# Patient Record
(demographics unavailable — no encounter records)

---

## 2024-10-08 NOTE — HISTORY OF PRESENT ILLNESS
[FreeTextEntry1] : Mr. 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, CAD, type 2 DM    who presented for follow up evaluation of type 2 Diabetes/ incidental thyroid nodules    Diagnosis: over 10 years now Current Regimen: Trulicity 4.5 mg Q week , metformin 1000 mg BID  , glyburide 5 mg OD in am , Toujeo 10 units daily (started 6/2024  by PCP ) Previous regimens: invokamet (stopped because UTI )  Compliance: good SMBG/CGM :  Hypoglycemia: very rare   Polyuria/polydipsia :  yes Weight change/BMI: stable  Diet:  try to limit carbs  Exercise: active  HBa1c trend: 7.1% ( 8/2023) ...6.9%( 3/2024)...7.1%( 8/2024)   .prevention   Statin: Atorvastatin 80 mg daily ,  ACE/ARB :  lisinopril 40 mg daily  Eye examination:  yes followed  7/2024   Neuropathy: yes, no feeling in LE    # incidental thyroid nodules - seen on recent carotid US  - no hyperthyroid symptoms - no compressive symptoms , no NECK radiation only head  - 10/2023: thyroid US : stable nodules, bilaterally

## 2024-10-08 NOTE — DATA REVIEWED
[FreeTextEntry1] : 8/2023  A1c 7.1% Tg 223 LDL 68 crea 1.1 GFR 72  2/2024: A1c 6.9% LDL 57  Tg 238 GFR 54 k 5.2  5/2024: A1c 6.3%  8/2024: A1c 7.1% TSH 2.14 ft4 normal  9/2024:  glucose 132 crea 1.3  GFr 59  calcium 10.7 albumin 4.5 LDL 83  Tg 235   thyroid US ( 10/2023) stable nodules

## 2024-10-08 NOTE — ASSESSMENT
[FreeTextEntry1] : wife Aidee : [phone]  : per patient request call wife for results    Mr. 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 , CAD, type 2 DM    who presented for follow up evaluation of type 2 Diabetes/ incidental thyroid nodules    # type 2 DM / hypoglycemia/ CAD  - A1c 7.1%   on Trulicity 4.5 mg Q week ,metformin 1000 mg BID , glyburide 5 mg OD , Toujeo 10 units daily  - Off invokamet because of UTIs - discussed switching trulicity to mounjaro and stopping insulin wife prefers not and is happy with current management , discussed monitoring for hypoglycemia  - continue statin and is on fish oil  - continue lisinopril  Eye examination:  yes followed  Neuropathy: yes no feeling sin LE    # incidental thyroid nodules - seen on recent carotid US  - no hyperthyroid symptoms - no compressive symptoms , no NECK radiation only head  -thyroid US (10/2023) stable - get thyroid US will cll with results

## 2024-10-08 NOTE — REASON FOR VISIT
[Follow - Up] : a follow-up visit [DM Type 2] : DM Type 2 [Thyroid nodule/ MNG] : thyroid nodule/ MNG [Other: _____] : [unfilled]

## 2024-10-08 NOTE — PHYSICAL EXAM
[Alert] : alert [No Acute Distress] : no acute distress [No Proptosis] : no proptosis [No Lid Lag] : no lid lag [No Respiratory Distress] : no respiratory distress [No Accessory Muscle Use] : no accessory muscle use [Clear to Auscultation] : lungs were clear to auscultation bilaterally [Normal S1, S2] : normal S1 and S2 [Regular Rhythm] : with a regular rhythm [No Edema] : no peripheral edema [Soft] : abdomen soft [No CVA Tenderness] : no ~M costovertebral angle tenderness [No Stigmata of Cushings Syndrome] : no stigmata of Cushings Syndrome [Oriented x3] : oriented to person, place, and time [Abdominal Striae] : no abdominal striae [de-identified] : nodular

## 2024-10-31 NOTE — REASON FOR VISIT
[Hyperlipidemia] : hyperlipidemia [Hypertension] : hypertension [Coronary Artery Disease] : coronary artery disease [FreeTextEntry3] : Dr. Byers  [FreeTextEntry1] : .

## 2024-10-31 NOTE — HISTORY OF PRESENT ILLNESS
[FreeTextEntry1] : The patient has history of having a MI in 2007. He had a PCI and stent placed in the LAD in 2007. He had 3 stents placed stent in his LAD prox and distal and RPDA. Since that time, he has been feeling well without chest pain or SOB. He had a nuclear stress test in 2021 which showed no ischemia. The patient had carotid doppler which did not show significant stenosis but there was however multiple thyroid gland. There was a question of bilateral vertebral occlusion . He has not seen vascular as of yet. The patient had a nuclear stress test which did not show ischemia.  The patient has had a burning and sharp pain which has been occurring for the past 2 months.  He did not tell me this at his last visit. He was told to go to the ER.  Had NSTEMI.  Had went for Cath and he had severe stenosis of the LAD prox and mod disease of the LCX and mild disease of the RCA. He had DANYEL placed in the LAD   The patient has done well until last Friday when while walking the dog had chest burning in his chest. The patient had gone to the ER and had chest pain resolved. It did recur until that night when it resolved. He had a nuclear stress test which was negative for ischemia. He has not had a repeat episode since then. No significant increase in Troponin compared to baseline.   The patient was readmitted with weakness and fatigue. He was found to have cystitis and UTI and needed antibiotic. He does need a prostate procedure but has a recent PCI and DANYEL. No chest pain or SOB   5- The patient has not had chest pain or SOB. His diabetes has been an issue and her is being followed by an endocrinologist in [place] .  7-3-2024 The patient was admitted to [facility] with chest pain. Somewhat pleuritic in nature. No change with position. ECG showed ST elevation. The patient has sent to [facility].  Troponin was negative. He was started on a short course of steroids then colchicine. Since that time, he has not had further CP. BP according to the patient is high in the morning. He has had migraine headaches and has been getting treatment from neurology.  9- The patient has had no chest pain. No SOB. He was admitted with sepsis.   10- Patient presents for follow up visit. Patient had LHC on 10/21/24 with DANYEL to LCX. Patient has 8 previous stents. Reports worsening shortness of breath x1 week. Denies chest pain, palpitations, dizziness, LE swelling. The patient has had progression of his CAD .

## 2024-10-31 NOTE — REVIEW OF SYSTEMS
[Headache] : headache [Feeling Fatigued] : feeling fatigued [Urinary Frequency] : urinary frequency [Tingling (Paresthesia)] : tingling [Negative] : Heme/Lymph

## 2024-10-31 NOTE — PHYSICAL EXAM
[No Edema] : no edema [Diminished Pedal Pulses ___] : diminished pedal pulses [unfilled] [Normal] : alert and oriented, normal memory

## 2024-10-31 NOTE — ASSESSMENT
[FreeTextEntry1] : The patient has known CAD. He had PCI and stent of LAD in . Had previous prox LAD stents as well. LCX with mod disease and RCA with mild disease. He has been admitted with weakness and fatigue and was found to have a UTI and cystitis. The patient has had no chest pain. Lisinopril was stopped then restarted at 20 mg. BP is normal today. His wife states that his BP is high in the morning hours. The patient does have proteinuria, uncertain the degree of this. This was worked up by PCP. Will have patent see nephrology. This is still pending   The patient was admitted for CP and was found to have pericarditis. He was given a short course of steroids and then colchicine. This has improved. The patient is seeing neurology for his unsteadiness. He needs to see nephrology for his proteinuria. He has a positive REHANA, and he was told to see a rheumatologist.  9- The patient has not had further chest pain. The patient has been seen by endocrine and uncertain if he had seen rheumatology and renal. 10- The patient had repeat cath after nuclear stress test showed lateral ischemia . He had an epiosde of chest pain prior to PT at the time . He had new 90% stenosis of the LCX and had another PCI .

## 2024-10-31 NOTE — CARDIOLOGY SUMMARY
[de-identified] : 3- NSR oild IWMI NS T wave change  5-1-2023 NSR T wave change possible lateral ischemia . Cannot r/P Inferior wall Mi ? age  8- NSR Possoble old IWMI . T wave change possible lateral ischemia .  2- NSR T wave change c/w lateral ischemai . cannot R/O Old IWMI .  7-3-2024 NSR Early repolarization . T wave change possible lateral ischemai .  9- NSR NS ST-T change  10- NSR 71bpm, ST and T wave abnormality [de-identified] : 5-4-2021 Lexiscan stress test No ischemia  5-2023 Lexiscan stress test No ischemia .  10- Lexiscan small sized defect involving the apical lateral wall of the LV. Positive for ischemia. LVE 63% [de-identified] : 3- EF 69% ,mild MR mild dilitation of the ascending aorta  5-1-2023 Normal Lv systolic function mild MRmild TR mild dilitation of the ascending aorta 3.8 cm  12- Normal LV systolic function   6- EF 66% mod conentricu LVH PASP was 39 mmhg RA pressure was 15 mmhg Trival peridcardial effusion   [de-identified] : PCI LAD 2007  PCI DANYEL in the LAD and RPDA in 2019  11- 99% prox LAD  3.5x28mm DANYEL placed . More prox to previous stent which was patent . LCX Mod disease . RCA mild disease .  [de-identified] : XIOMY normal bilaterally . \par  Carotid doppler <50% bialtareally multinodular thyroid . \par

## 2024-11-27 NOTE — CARDIOLOGY SUMMARY
[de-identified] : 3- NSR oild IWMI NS T wave change  5-1-2023 NSR T wave change possible lateral ischemia . Cannot r/P Inferior wall Mi ? age  8- NSR Possoble old IWMI . T wave change possible lateral ischemia .  2- NSR T wave change c/w lateral ischemai . cannot R/O Old IWMI .  7-3-2024 NSR Early repolarization . T wave change possible lateral ischemai .  9- NSR NS ST-T change  10- NSR 71bpm, ST and T wave abnormality 11- NSR ST-T changes ? lateral ischemai  [de-identified] : 5-4-2021 Lexiscan stress test No ischemia  5-2023 Lexiscan stress test No ischemia .  10- Lexiscan small sized defect involving the apical lateral wall of the LV. Positive for ischemia. LVE 63% [de-identified] : 3- EF 69% ,mild MR mild dilitation of the ascending aorta  5-1-2023 Normal Lv systolic function mild MRmild TR mild dilitation of the ascending aorta 3.8 cm  12- Normal LV systolic function   6- EF 66% mod conentricu LVH PASP was 39 mmhg RA pressure was 15 mmhg Trival peridcardial effusion   [de-identified] : PCI LAD 2007  PCI DANYEL in the LAD and RPDA in 2019  11- 99% prox LAD  3.5x28mm DANYEL placed . More prox to previous stent which was patent . LCX Mod disease . RCA mild disease .  [de-identified] : XIOMY normal bilaterally . \par  Carotid doppler <50% bialtareally multinodular thyroid . \par

## 2024-11-27 NOTE — HISTORY OF PRESENT ILLNESS
[FreeTextEntry1] : The patient has history of having a MI in 2007. He had a PCI and stent placed in the LAD in 2007. He had 3 stents placed stent in his LAD prox and distal and RPDA. Since that time, he has been feeling well without chest pain or SOB. He had a nuclear stress test in 2021 which showed no ischemia. The patient had carotid doppler which did not show significant stenosis but there was however multiple thyroid gland. There was a question of bilateral vertebral occlusion . He has not seen vascular as of yet. The patient had a nuclear stress test which did not show ischemia.  The patient has had a burning and sharp pain which has been occurring for the past 2 months.  He did not tell me this at his last visit. He was told to go to the ER.  Had NSTEMI.  Had went for Cath and he had severe stenosis of the LAD prox and mod disease of the LCX and mild disease of the RCA. He had DANYEL placed in the LAD   The patient has done well until last Friday when while walking the dog had chest burning in his chest. The patient had gone to the ER and had chest pain resolved. It did recur until that night when it resolved. He had a nuclear stress test which was negative for ischemia. He has not had a repeat episode since then. No significant increase in Troponin compared to baseline.   The patient was readmitted with weakness and fatigue. He was found to have cystitis and UTI and needed antibiotic. He does need a prostate procedure but has a recent PCI and DANYEL. No chest pain or SOB   5- The patient has not had chest pain or SOB. His diabetes has been an issue and her is being followed by an endocrinologist in [place] .  7-3-2024 The patient was admitted to [facility] with chest pain. Somewhat pleuritic in nature. No change with position. ECG showed ST elevation. The patient has sent to [facility].  Troponin was negative. He was started on a short course of steroids then colchicine. Since that time, he has not had further CP. BP according to the patient is high in the morning. He has had migraine headaches and has been getting treatment from neurology.  9- The patient has had no chest pain. No SOB. He was admitted with sepsis.   10- Patient presents for follow up visit. Patient had LHC on 10/21/24 with DANYEL to LCX. Patient has 8 previous stents. Reports worsening shortness of breath x1 week. Denies chest pain, palpitations, dizziness, LE swelling. The patient has had progression of his CAD .   11- The patient has not been eating right . Eating fried food and chinese food . TG are increased but BP is improved and LDL is at goal.

## 2024-11-27 NOTE — ASSESSMENT
[FreeTextEntry1] : The patient has known CAD. He had PCI and stent of LAD in . Had previous prox LAD stents as well. LCX with mod disease and RCA with mild disease. He has been admitted with weakness and fatigue and was found to have a UTI and cystitis. The patient has had no chest pain. Lisinopril was stopped then restarted at 20 mg. BP is normal today. His wife states that his BP is high in the morning hours. The patient does have proteinuria, uncertain the degree of this. This was worked up by PCP. Will have patent see nephrology. This is still pending   The patient was admitted for CP and was found to have pericarditis. He was given a short course of steroids and then colchicine. This has improved. The patient is seeing neurology for his unsteadiness. He needs to see nephrology for his proteinuria. He has a positive REHANA, and he was told to see a rheumatologist.  9- The patient has not had further chest pain. The patient has been seen by endocrine and uncertain if he had seen rheumatology and renal. 10- The patient had repeat cath after nuclear stress test showed lateral ischemia . He had an epiosde of chest pain prior to PT at the time . He had new 90% stenosis of the LCX and had another PCI .   11- The patient has improved . Less SOB and BP is improved . Discussed diet with patient . Eating Chinese food and fried food . Told not to and to eat more whole foods of chicken and fish veg. et al . He is on cochicine which has been well tolerated .

## 2024-12-09 NOTE — PHYSICAL EXAM
[Varicose Veins Of Lower Extremities] : bilaterally [Ankle Swelling On The Left] : moderate [1+] : left foot dorsalis pedis 1+ [de-identified] : Hammertoes toes 2-5 B/L Limited ROM of the 1st MTPJ B/L  Ankle Equinus B/L  [FreeTextEntry1] : Soft tissue enlarged mass plantar lateral R foot.  Elongated nails x10 Callus x2  Dry skin B/L feet  [Vibration Dec.] : diminished vibratory sensation at the level of the toes [Diminished Throughout Right Foot] : diminished sensation with monofilament testing throughout right foot [Diminished Throughout Left Foot] : diminished sensation with monofilament testing throughout left foot

## 2024-12-09 NOTE — ASSESSMENT
[FreeTextEntry1] : Diabetic Foot Risk Assessment - Educated on proper foot care and shoe wear - Dbx of nails x10 - Dbx of calluses x2  - F/u with vascular regarding toe discoloration  RTO 3 months for routine care or sooner if any problem arise [Verbal] : verbal [Patient] : patient [Good - alert, interested, motivated] : Good - alert, interested, motivated [Demonstrates independently] : demonstrates independently [Dressing changes] : dressing changes [Foot Care] : foot care [Glycemic Control] : glycemic control

## 2024-12-09 NOTE — HISTORY OF PRESENT ILLNESS
[FreeTextEntry1] : Diabetic Foot Care  - DM controlled with meds - Long standing DM - Cannot feel his feet - Long nails and calluses

## 2025-01-29 NOTE — ASSESSMENT
[FreeTextEntry1] : The patient has known CAD. He had PCI and stent of LAD in . Had previous prox LAD stents as well. LCX with mod disease and RCA with mild disease. He has been admitted with weakness and fatigue and was found to have a UTI and cystitis. The patient has had no chest pain. Lisinopril was stopped then restarted at 20 mg. BP is normal today. His wife states that his BP is high in the morning hours. The patient does have proteinuria, uncertain the degree of this. This was worked up by PCP. Will have patent see nephrology. This is still pending   The patient was admitted for CP and was found to have pericarditis. He was given a short course of steroids and then colchicine. This has improved. The patient is seeing neurology for his unsteadiness. He needs to see nephrology for his proteinuria. He has a positive REHANA, and he was told to see a rheumatologist.  9- The patient has not had further chest pain. The patient has been seen by endocrine and uncertain if he had seen rheumatology and renal. 10- The patient had repeat cath after nuclear stress test showed lateral ischemia . He had an epiosde of chest pain prior to PT at the time . He had new 90% stenosis of the LCX and had another PCI .   11- The patient has improved . Less SOB and BP is improved . Discussed diet with patient . Eating Chinese food and fried food . Told not to and to eat more whole foods of chicken and fish veg. et al . He is on cochicine which has been well tolerated .   1- The patient has not had chest pain . He is on GDMT including colchicine for his CAD   His BP is very well controlled din the office today . Being followed by his PCP for thrombocytopenia . A1C is ok

## 2025-01-29 NOTE — HISTORY OF PRESENT ILLNESS
[FreeTextEntry1] : The patient has history of having a MI in 2007. He had a PCI and stent placed in the LAD in 2007. He had 3 stents placed stent in his LAD prox and distal and RPDA. Since that time, he has been feeling well without chest pain or SOB. He had a nuclear stress test in 2021 which showed no ischemia. The patient had carotid doppler which did not show significant stenosis but there was however multiple thyroid gland. There was a question of bilateral vertebral occlusion . He has not seen vascular as of yet. The patient had a nuclear stress test which did not show ischemia.  The patient has had a burning and sharp pain which has been occurring for the past 2 months.  He did not tell me this at his last visit. He was told to go to the ER.  Had NSTEMI.  Had went for Cath and he had severe stenosis of the LAD prox and mod disease of the LCX and mild disease of the RCA. He had DANYEL placed in the LAD   The patient has done well until last Friday when while walking the dog had chest burning in his chest. The patient had gone to the ER and had chest pain resolved. It did recur until that night when it resolved. He had a nuclear stress test which was negative for ischemia. He has not had a repeat episode since then. No significant increase in Troponin compared to baseline.   The patient was readmitted with weakness and fatigue. He was found to have cystitis and UTI and needed antibiotic. He does need a prostate procedure but has a recent PCI and DANYEL. No chest pain or SOB   5- The patient has not had chest pain or SOB. His diabetes has been an issue and her is being followed by an endocrinologist in [place] .  7-3-2024 The patient was admitted to [facility] with chest pain. Somewhat pleuritic in nature. No change with position. ECG showed ST elevation. The patient has sent to [facility].  Troponin was negative. He was started on a short course of steroids then colchicine. Since that time, he has not had further CP. BP according to the patient is high in the morning. He has had migraine headaches and has been getting treatment from neurology.  9- The patient has had no chest pain. No SOB. He was admitted with sepsis.   10- Patient presents for follow up visit. Patient had LHC on 10/21/24 with DANYEL to LCX. Patient has 8 previous stents. Reports worsening shortness of breath x1 week. Denies chest pain, palpitations, dizziness, LE swelling. The patient has had progression of his CAD .   11- The patient has not been eating right . Eating fried food and chinese food . TG are increased but BP is improved and LDL is at goal.   1- The patient has had no chest pain . S/P PCI in October of the prox LCX . There is a distal segement of the LAD which is not amenable to intervention which is teated medically . CAD appears to have stabilized after high intensity statins and colchicine

## 2025-01-29 NOTE — CARDIOLOGY SUMMARY
[de-identified] : 3- NSR oild IWMI NS T wave change  5-1-2023 NSR T wave change possible lateral ischemia . Cannot r/P Inferior wall Mi ? age  8- NSR Possoble old IWMI . T wave change possible lateral ischemia .  2- NSR T wave change c/w lateral ischemai . cannot R/O Old IWMI .  7-3-2024 NSR Early repolarization . T wave change possible lateral ischemai .  9- NSR NS ST-T change  10- NSR 71bpm, ST and T wave abnormality 11- NSR ST-T changes ? lateral ischemai ' NSR IVCD RBBB morphology 1-  [de-identified] : 5-4-2021 Lexiscan stress test No ischemia  5-2023 Lexiscan stress test No ischemia .  10- Lexiscan small sized defect involving the apical lateral wall of the LV. Positive for ischemia. LVE 63% [de-identified] : 3- EF 69% ,mild MR mild dilitation of the ascending aorta  5-1-2023 Normal Lv systolic function mild MRmild TR mild dilitation of the ascending aorta 3.8 cm  12- Normal LV systolic function   6- EF 66% mod conentricu LVH PASP was 39 mmhg RA pressure was 15 mmhg Trival peridcardial effusion   [de-identified] : PCI LAD 2007  PCI DANYEL in the LAD and RPDA in 2019  11- 99% prox LAD  3.5x28mm DANYEL placed . More prox to previous stent which was patent . LCX Mod disease . RCA mild disease .   Previous stents patent . 90% prox LCX S/P DANYEL RCA 50% , Distal LAD with 90% stnosis not amenable to intervention  [de-identified] : XIOMY normal bilaterally . \par  Carotid doppler <50% bialtareally multinodular thyroid . \par

## 2025-01-30 NOTE — ADDENDUM
[FreeTextEntry1] : Patient's note was transcribed with the assistance of a medical scribe under the supervision of Dr. Beatty. I, Dr. Beatty, have reviewed the patient's chart and agree that it aligns with my medical decisions. Kimani Larsen, our scribe, also served as a chaperone for physical examination purposes.

## 2025-03-10 NOTE — PHYSICAL EXAM
[Varicose Veins Of Lower Extremities] : bilaterally [Ankle Swelling On The Left] : moderate [1+] : left foot dorsalis pedis 1+ [de-identified] : Hammertoes toes 2-5 B/L Limited ROM of the 1st MTPJ B/L  Ankle Equinus B/L  [FreeTextEntry1] : Soft tissue enlarged mass plantar lateral R foot.  Elongated nails x10 Callus x2  Dry skin B/L feet  [Vibration Dec.] : diminished vibratory sensation at the level of the toes [Diminished Throughout Right Foot] : diminished sensation with monofilament testing throughout right foot [Diminished Throughout Left Foot] : diminished sensation with monofilament testing throughout left foot

## 2025-04-10 NOTE — PHYSICAL EXAM
[Alert] : alert [No Acute Distress] : no acute distress [No Proptosis] : no proptosis [No Lid Lag] : no lid lag [No Respiratory Distress] : no respiratory distress [No Accessory Muscle Use] : no accessory muscle use [Clear to Auscultation] : lungs were clear to auscultation bilaterally [Normal S1, S2] : normal S1 and S2 [Regular Rhythm] : with a regular rhythm [No Edema] : no peripheral edema [Soft] : abdomen soft [No CVA Tenderness] : no ~M costovertebral angle tenderness [No Stigmata of Cushings Syndrome] : no stigmata of Cushings Syndrome [Oriented x3] : oriented to person, place, and time [Abdominal Striae] : no abdominal striae [de-identified] : nodular

## 2025-04-10 NOTE — HISTORY OF PRESENT ILLNESS
[FreeTextEntry1] : Mr. 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, CAD, type 2 DM    who presented for follow up evaluation of type 2 Diabetes/ incidental thyroid nodules    Diagnosis: over 10 years now Current Regimen: Trulicity 4.5 mg Q week , metformin 1000 mg BID  , glyburide 5 mg OD in am , Toujeo 14 units daily (started 6/2024  by PCP ) Previous regimens: invokamet (stopped because UTI )  Compliance: good SMBG/CGM :  Hypoglycemia: very rare   Polyuria/polydipsia :  yes Weight change/BMI: stable  Diet:  try to limit carbs  Exercise: active  HBa1c trend: 7.1% ( 8/2023) ...6.9%( 3/2024)...7.1%( 8/2024)...6.3%( 1/2025)   .prevention   Statin: Atorvastatin 80 mg daily ,  ACE/ARB :  lisinopril 40 mg daily  Eye examination:  yes followed  7/2024   Neuropathy: yes, no feeling in LE    # incidental thyroid nodules - seen on recent carotid US  - no hyperthyroid symptoms - no compressive symptoms , no NECK radiation only head  - 10/2023: thyroid US : stable nodules, bilaterally  did not get US yet

## 2025-04-10 NOTE — DATA REVIEWED
[FreeTextEntry1] : 8/2023  A1c 7.1% Tg 223 LDL 68 crea 1.1 GFR 72  2/2024: A1c 6.9% LDL 57  Tg 238 GFR 54 k 5.2  5/2024: A1c 6.3%  8/2024: A1c 7.1% TSH 2.14 ft4 normal  9/2024:  glucose 132 crea 1.3  GFr 59  calcium 10.7 albumin 4.5 LDL 83  Tg 235  1/2025  A1c 6.3% LDL 69  tg 216   zGFR 72     thyroid US ( 10/2023) stable nodules

## 2025-04-10 NOTE — ASSESSMENT
[FreeTextEntry1] : wife Aidee : [phone]  : per patient request call wife for results    Mr. 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 , CAD, type 2 DM    who presented for follow up evaluation of type 2 Diabetes/ incidental thyroid nodules    # type 2 DM / hypoglycemia/ CAD  - A1c 6.3%  on Trulicity 4.5 mg Q week ,metformin 1000 mg BID , glyburide 5 mg OD , Toujeo 14 units daily  - Off invokamet because of UTIs - continue statin and is on fish oil  - continue lisinopril  Eye examination:  yes followed  Neuropathy: yes no feeling sin LE    # incidental thyroid nodules - seen on recent carotid US  - no hyperthyroid symptoms - no compressive symptoms , no NECK radiation only head  -thyroid US (10/2023) stable - did not get US yet , once done wife will call

## 2025-04-23 NOTE — CARDIOLOGY SUMMARY
[de-identified] : 3- NSR oild IWMI NS T wave change  5-1-2023 NSR T wave change possible lateral ischemia . Cannot r/P Inferior wall Mi ? age  8- NSR Possoble old IWMI . T wave change possible lateral ischemia .  2- NSR T wave change c/w lateral ischemai . cannot R/O Old IWMI .  7-3-2024 NSR Early repolarization . T wave change possible lateral ischemai .  9- NSR NS ST-T change  10- NSR 71bpm, ST and T wave abnormality 11- NSR ST-T changes ? lateral ischemai ' NSR IVCD RBBB morphology 1- 4- NSR RBBB  [de-identified] : 5-4-2021 Lexiscan stress test No ischemia  5-2023 Lexiscan stress test No ischemia .  10- Lexiscan small sized defect involving the apical lateral wall of the LV. Positive for ischemia. LVE 63% [de-identified] : 3- EF 69% ,mild MR mild dilitation of the ascending aorta  5-1-2023 Normal Lv systolic function mild MRmild TR mild dilitation of the ascending aorta 3.8 cm  12- Normal LV systolic function   6- EF 66% mod conentricu LVH PASP was 39 mmhg RA pressure was 15 mmhg Trival peridcardial effusion   [de-identified] : PCI LAD 2007  PCI DANYEL in the LAD and RPDA in 2019  11- 99% prox LAD  3.5x28mm DANYEL placed . More prox to previous stent which was patent . LCX Mod disease . RCA mild disease .   Previous stents patent . 90% prox LCX S/P DANYEL RCA 50% , Distal LAD with 90% stnosis not amenable to intervention  [de-identified] : XIOMY normal bilaterally . \par  Carotid doppler <50% bialtareally multinodular thyroid . \par

## 2025-04-23 NOTE — HISTORY OF PRESENT ILLNESS
[FreeTextEntry1] : The patient has history of having a MI in 2007. He had a PCI and stent placed in the LAD in 2007. He had 3 stents placed stent in his LAD prox and distal and RPDA. Since that time, he has been feeling well without chest pain or SOB. He had a nuclear stress test in 2021 which showed no ischemia. The patient had carotid doppler which did not show significant stenosis but there was however multiple thyroid gland. There was a question of bilateral vertebral occlusion . He has not seen vascular as of yet. The patient had a nuclear stress test which did not show ischemia.  The patient has had a burning and sharp pain which has been occurring for the past 2 months.  He did not tell me this at his last visit. He was told to go to the ER.  Had NSTEMI.  Had went for Cath and he had severe stenosis of the LAD prox and mod disease of the LCX and mild disease of the RCA. He had DANYEL placed in the LAD   The patient has done well until last Friday when while walking the dog had chest burning in his chest. The patient had gone to the ER and had chest pain resolved. It did recur until that night when it resolved. He had a nuclear stress test which was negative for ischemia. He has not had a repeat episode since then. No significant increase in Troponin compared to baseline.   The patient was readmitted with weakness and fatigue. He was found to have cystitis and UTI and needed antibiotic. He does need a prostate procedure but has a recent PCI and DANYEL. No chest pain or SOB   5- The patient has not had chest pain or SOB. His diabetes has been an issue and her is being followed by an endocrinologist in [place] .  7-3-2024 The patient was admitted to [facility] with chest pain. Somewhat pleuritic in nature. No change with position. ECG showed ST elevation. The patient has sent to [facility].  Troponin was negative. He was started on a short course of steroids then colchicine. Since that time, he has not had further CP. BP according to the patient is high in the morning. He has had migraine headaches and has been getting treatment from neurology.  9- The patient has had no chest pain. No SOB. He was admitted with sepsis.   10- Patient presents for follow up visit. Patient had LHC on 10/21/24 with DANYEL to LCX. Patient has 8 previous stents. Reports worsening shortness of breath x1 week. Denies chest pain, palpitations, dizziness, LE swelling. The patient has had progression of his CAD .   11- The patient has not been eating right . Eating fried food and chinese food . TG are increased but BP is improved and LDL is at goal.   1- The patient has had no chest pain . S/P PCI in October of the prox LCX . There is a distal segement of the LAD which is not amenable to intervention which is teated medically . CAD appears to have stabilized after high intensity statins and colchicine   4- The patient continues to do better. No chest pain  .States that his BP can be high on his home monitor but it has been good in office visit .

## 2025-04-23 NOTE — ASSESSMENT
[FreeTextEntry1] : The patient has known CAD. He had PCI and stent of LAD in . Had previous prox LAD stents as well. LCX with mod disease and RCA with mild disease. He has been admitted with weakness and fatigue and was found to have a UTI and cystitis. The patient has had no chest pain. Lisinopril was stopped then restarted at 20 mg. BP is normal today. His wife states that his BP is high in the morning hours. The patient does have proteinuria, uncertain the degree of this. This was worked up by PCP. Will have patent see nephrology. This is still pending   The patient was admitted for CP and was found to have pericarditis. He was given a short course of steroids and then colchicine. This has improved. The patient is seeing neurology for his unsteadiness. He needs to see nephrology for his proteinuria. He has a positive REHANA, and he was told to see a rheumatologist.  9- The patient has not had further chest pain. The patient has been seen by endocrine and uncertain if he had seen rheumatology and renal. 10- The patient had repeat cath after nuclear stress test showed lateral ischemia . He had an epiosde of chest pain prior to PT at the time . He had new 90% stenosis of the LCX and had another PCI   11- The patient has improved . Less SOB and BP is improved . Discussed diet with patient . Eating Chinese food and fried food . Told not to and to eat more whole foods of chicken and fish veg. et al . He is on cochicine which has been well tolerated .   1- The patient has not had chest pain . He is on GDMT including colchicine for his CAD   His BP is very well controlled din the office today . Being followed by his PCP for thrombocytopenia . A1C is ok  4- The patient is going better after staring Colchicine . Suspect inflamation as an underlyng etiology . Potasssium is high . ALT is high . TG are high indicationg high calorie intake . The patient has not had chest pain or SOB . He continues to do better .

## 2025-06-09 NOTE — PHYSICAL EXAM
[Varicose Veins Of Lower Extremities] : bilaterally [Ankle Swelling On The Left] : moderate [1+] : right foot dorsalis pedis 1+ [de-identified] : Hammertoes toes 2-5 B/L Limited ROM of the 1st MTPJ B/L  Ankle Equinus B/L  [FreeTextEntry1] : Soft tissue enlarged mass plantar lateral R foot.  Elongated nails x10 Callus x2  Dry skin B/L feet  [Vibration Dec.] : diminished vibratory sensation at the level of the toes [Diminished Throughout Right Foot] : diminished sensation with monofilament testing throughout right foot [Diminished Throughout Left Foot] : diminished sensation with monofilament testing throughout left foot

## 2025-06-09 NOTE — ASSESSMENT
[FreeTextEntry1] : Diabetic Foot Risk Assessment - Educated on proper foot care and shoe wear - Dbx of nails x10 - Dbx of calluses x2   RTO 3 months for routine care or sooner if any problem arise [Verbal] : verbal [Good - alert, interested, motivated] : Good - alert, interested, motivated [Patient] : patient [Demonstrates independently] : demonstrates independently [Dressing changes] : dressing changes [Glycemic Control] : glycemic control [Foot Care] : foot care

## 2025-07-18 NOTE — ASSESSMENT
[FreeTextEntry1] : The patient has known CAD. He had PCI and stent of LAD in . Had previous prox LAD stents as well. LCX with mod disease and RCA with mild disease. He has been admitted with weakness and fatigue and was found to have a UTI and cystitis. The patient has had no chest pain. Lisinopril was stopped then restarted at 20 mg. BP is normal today. His wife states that his BP is high in the morning hours. The patient does have proteinuria, uncertain the degree of this. This was worked up by PCP. Will have patent see nephrology. This is still pending   The patient was admitted for CP and was found to have pericarditis. He was given a short course of steroids and then colchicine. This has improved. The patient is seeing neurology for his unsteadiness. He needs to see nephrology for his proteinuria. He has a positive REHANA, and he was told to see a rheumatologist.  9- The patient has not had further chest pain. The patient has been seen by endocrine and uncertain if he had seen rheumatology and renal. 10- The patient had repeat cath after nuclear stress test showed lateral ischemia . He had an epiosde of chest pain prior to PT at the time . He had new 90% stenosis of the LCX and had another PCI   11- The patient has improved . Less SOB and BP is improved . Discussed diet with patient . Eating Chinese food and fried food . Told not to and to eat more whole foods of chicken and fish veg. et al . He is on cochicine which has been well tolerated .   1- The patient has not had chest pain . He is on GDMT including colchicine for his CAD   His BP is very well controlled din the office today . Being followed by his PCP for thrombocytopenia . A1C is ok  4- The patient is going better after staring Colchicine . Suspect inflamation as an underlyng etiology . Potasssium is high . ALT is high . TG are high indicationg high calorie intake . The patient has not had chest pain or SOB . He continues to do better .  7- The patient has had no chest pain or SOB. LDL is <70 and BP is stable .  The patient has persistent increeased ALT and suspect that he may have fatty liver. He needs to go for his routine colonosocpy and if not urgent he was advised to wait until October which is one year after hislast PCI. Renal function is normal

## 2025-07-18 NOTE — CARDIOLOGY SUMMARY
[de-identified] : 3- NSR oild IWMI NS T wave change  5-1-2023 NSR T wave change possible lateral ischemia . Cannot r/P Inferior wall Mi ? age  8- NSR Possoble old IWMI . T wave change possible lateral ischemia .  2- NSR T wave change c/w lateral ischemai . cannot R/O Old IWMI .  7-3-2024 NSR Early repolarization . T wave change possible lateral ischemai .  9- NSR NS ST-T change  10- NSR 71bpm, ST and T wave abnormality 11- NSR ST-T changes ? lateral ischemai ' NSR IVCD RBBB morphology 1- 4- NSR RBBB  [de-identified] : 5-4-2021 Lexiscan stress test No ischemia  5-2023 Lexiscan stress test No ischemia .  10- Lexiscan small sized defect involving the apical lateral wall of the LV. Positive for ischemia. LVE 63% [de-identified] : 3- EF 69% ,mild MR mild dilitation of the ascending aorta  5-1-2023 Normal Lv systolic function mild MRmild TR mild dilitation of the ascending aorta 3.8 cm  12- Normal LV systolic function   6- EF 66% mod conentricu LVH PASP was 39 mmhg RA pressure was 15 mmhg Trival peridcardial effusion   [de-identified] : PCI LAD 2007  PCI DANYEL in the LAD and RPDA in 2019  11- 99% prox LAD  3.5x28mm DANYEL placed . More prox to previous stent which was patent . LCX Mod disease . RCA mild disease .   Previous stents patent . 90% prox LCX S/P DANYEL RCA 50% , Distal LAD with 90% stnosis not amenable to intervention  [de-identified] : XIOMY normal bilaterally . \par  Carotid doppler <50% bialtareally multinodular thyroid . \par

## 2025-07-18 NOTE — HISTORY OF PRESENT ILLNESS
[FreeTextEntry1] : The patient has history of having a MI in 2007. He had a PCI and stent placed in the LAD in 2007. He had 3 stents placed stent in his LAD prox and distal and RPDA. Since that time, he has been feeling well without chest pain or SOB. He had a nuclear stress test in 2021 which showed no ischemia. The patient had carotid doppler which did not show significant stenosis but there was however multiple thyroid gland. There was a question of bilateral vertebral occlusion . He has not seen vascular as of yet. The patient had a nuclear stress test which did not show ischemia.  The patient has had a burning and sharp pain which has been occurring for the past 2 months.  He did not tell me this at his last visit. He was told to go to the ER.  Had NSTEMI.  Had went for Cath and he had severe stenosis of the LAD prox and mod disease of the LCX and mild disease of the RCA. He had DANYEL placed in the LAD   The patient has done well until last Friday when while walking the dog had chest burning in his chest. The patient had gone to the ER and had chest pain resolved. It did recur until that night when it resolved. He had a nuclear stress test which was negative for ischemia. He has not had a repeat episode since then. No significant increase in Troponin compared to baseline.   The patient was readmitted with weakness and fatigue. He was found to have cystitis and UTI and needed antibiotic. He does need a prostate procedure but has a recent PCI and DANYEL. No chest pain or SOB   5- The patient has not had chest pain or SOB. His diabetes has been an issue and her is being followed by an endocrinologist in [place] .  7-3-2024 The patient was admitted to [facility] with chest pain. Somewhat pleuritic in nature. No change with position. ECG showed ST elevation. The patient has sent to [facility].  Troponin was negative. He was started on a short course of steroids then colchicine. Since that time, he has not had further CP. BP according to the patient is high in the morning. He has had migraine headaches and has been getting treatment from neurology.  9- The patient has had no chest pain. No SOB. He was admitted with sepsis.   10- Patient presents for follow up visit. Patient had LHC on 10/21/24 with DANYEL to LCX. Patient has 8 previous stents. Reports worsening shortness of breath x1 week. Denies chest pain, palpitations, dizziness, LE swelling. The patient has had progression of his CAD .   11- The patient has not been eating right . Eating fried food and chinese food . TG are increased but BP is improved and LDL is at goal.   1- The patient has had no chest pain . S/P PCI in October of the prox LCX . There is a distal segement of the LAD which is not amenable to intervention which is teated medically . CAD appears to have stabilized after high intensity statins and colchicine   4- The patient continues to do better. No chest pain  .States that his BP can be high on his home monitor but it has been good in office visit .  7- The patient has had no chest pain or SOB . Some fatigue . Needs surveillance colonosocpy , he will delay this until October which is one year after his last stent